# Patient Record
Sex: MALE | Race: WHITE | Employment: OTHER | URBAN - METROPOLITAN AREA
[De-identification: names, ages, dates, MRNs, and addresses within clinical notes are randomized per-mention and may not be internally consistent; named-entity substitution may affect disease eponyms.]

---

## 2017-08-17 DIAGNOSIS — F17.200 NICOTINE USE DISORDER: ICD-10-CM

## 2017-08-17 RX ORDER — BUPROPION HYDROCHLORIDE 150 MG/1
TABLET, EXTENDED RELEASE ORAL
Qty: 60 TABLET | Refills: 0 | Status: SHIPPED | OUTPATIENT
Start: 2017-08-17 | End: 2019-03-27

## 2019-03-27 ENCOUNTER — TELEPHONE (OUTPATIENT)
Dept: FAMILY MEDICINE CLINIC | Age: 59
End: 2019-03-27

## 2019-03-27 ENCOUNTER — OFFICE VISIT (OUTPATIENT)
Dept: FAMILY MEDICINE CLINIC | Age: 59
End: 2019-03-27
Payer: COMMERCIAL

## 2019-03-27 VITALS
WEIGHT: 221 LBS | HEIGHT: 73 IN | OXYGEN SATURATION: 96 % | HEART RATE: 72 BPM | SYSTOLIC BLOOD PRESSURE: 112 MMHG | TEMPERATURE: 98.7 F | RESPIRATION RATE: 15 BRPM | DIASTOLIC BLOOD PRESSURE: 70 MMHG | BODY MASS INDEX: 29.29 KG/M2

## 2019-03-27 DIAGNOSIS — K31.84 GASTROPARESIS: ICD-10-CM

## 2019-03-27 DIAGNOSIS — E03.9 HYPOTHYROIDISM, UNSPECIFIED TYPE: ICD-10-CM

## 2019-03-27 DIAGNOSIS — E66.3 OVERWEIGHT: ICD-10-CM

## 2019-03-27 DIAGNOSIS — Z72.0 TOBACCO USE: Primary | ICD-10-CM

## 2019-03-27 DIAGNOSIS — J40 BRONCHITIS: ICD-10-CM

## 2019-03-27 DIAGNOSIS — R05.9 COUGH: ICD-10-CM

## 2019-03-27 PROCEDURE — 3017F COLORECTAL CA SCREEN DOC REV: CPT | Performed by: INTERNAL MEDICINE

## 2019-03-27 PROCEDURE — G8484 FLU IMMUNIZE NO ADMIN: HCPCS | Performed by: INTERNAL MEDICINE

## 2019-03-27 PROCEDURE — 99204 OFFICE O/P NEW MOD 45 MIN: CPT | Performed by: INTERNAL MEDICINE

## 2019-03-27 PROCEDURE — G8419 CALC BMI OUT NRM PARAM NOF/U: HCPCS | Performed by: INTERNAL MEDICINE

## 2019-03-27 PROCEDURE — G8427 DOCREV CUR MEDS BY ELIG CLIN: HCPCS | Performed by: INTERNAL MEDICINE

## 2019-03-27 PROCEDURE — 4004F PT TOBACCO SCREEN RCVD TLK: CPT | Performed by: INTERNAL MEDICINE

## 2019-03-27 RX ORDER — ONDANSETRON 4 MG/1
4 TABLET, FILM COATED ORAL EVERY 8 HOURS PRN
COMMUNITY

## 2019-03-27 RX ORDER — TRAZODONE HYDROCHLORIDE 50 MG/1
50 TABLET ORAL NIGHTLY
Qty: 10 TABLET | Refills: 0 | Status: SHIPPED | OUTPATIENT
Start: 2019-03-27 | End: 2019-04-10 | Stop reason: SDUPTHER

## 2019-03-27 RX ORDER — DEXTROMETHORPHAN HYDROBROMIDE AND PROMETHAZINE HYDROCHLORIDE 15; 6.25 MG/5ML; MG/5ML
5 SYRUP ORAL 4 TIMES DAILY PRN
Qty: 150 ML | Refills: 0 | Status: SHIPPED | OUTPATIENT
Start: 2019-03-27 | End: 2019-03-27 | Stop reason: DRUGHIGH

## 2019-03-27 RX ORDER — VARENICLINE TARTRATE 0.5 MG/1
0.5 TABLET, FILM COATED ORAL 2 TIMES DAILY
COMMUNITY

## 2019-03-27 ASSESSMENT — PATIENT HEALTH QUESTIONNAIRE - PHQ9
1. LITTLE INTEREST OR PLEASURE IN DOING THINGS: 0
SUM OF ALL RESPONSES TO PHQ QUESTIONS 1-9: 0
2. FEELING DOWN, DEPRESSED OR HOPELESS: 0
SUM OF ALL RESPONSES TO PHQ9 QUESTIONS 1 & 2: 0
SUM OF ALL RESPONSES TO PHQ QUESTIONS 1-9: 0

## 2019-03-27 ASSESSMENT — ENCOUNTER SYMPTOMS
SHORTNESS OF BREATH: 0
EYE PAIN: 0
ABDOMINAL PAIN: 0
BACK PAIN: 0
COUGH: 1

## 2019-04-10 ENCOUNTER — OFFICE VISIT (OUTPATIENT)
Dept: FAMILY MEDICINE CLINIC | Age: 59
End: 2019-04-10
Payer: COMMERCIAL

## 2019-04-10 VITALS
DIASTOLIC BLOOD PRESSURE: 72 MMHG | HEART RATE: 75 BPM | HEIGHT: 72 IN | BODY MASS INDEX: 30.37 KG/M2 | SYSTOLIC BLOOD PRESSURE: 128 MMHG | OXYGEN SATURATION: 99 % | TEMPERATURE: 97.2 F | WEIGHT: 224.2 LBS | RESPIRATION RATE: 15 BRPM

## 2019-04-10 DIAGNOSIS — J40 BRONCHITIS: ICD-10-CM

## 2019-04-10 DIAGNOSIS — F51.01 PRIMARY INSOMNIA: ICD-10-CM

## 2019-04-10 DIAGNOSIS — R05.9 COUGH: Primary | ICD-10-CM

## 2019-04-10 DIAGNOSIS — Z72.0 TOBACCO USE: ICD-10-CM

## 2019-04-10 PROCEDURE — 4004F PT TOBACCO SCREEN RCVD TLK: CPT | Performed by: INTERNAL MEDICINE

## 2019-04-10 PROCEDURE — G8427 DOCREV CUR MEDS BY ELIG CLIN: HCPCS | Performed by: INTERNAL MEDICINE

## 2019-04-10 PROCEDURE — 3017F COLORECTAL CA SCREEN DOC REV: CPT | Performed by: INTERNAL MEDICINE

## 2019-04-10 PROCEDURE — G8417 CALC BMI ABV UP PARAM F/U: HCPCS | Performed by: INTERNAL MEDICINE

## 2019-04-10 PROCEDURE — 99214 OFFICE O/P EST MOD 30 MIN: CPT | Performed by: INTERNAL MEDICINE

## 2019-04-10 RX ORDER — PREDNISONE 10 MG/1
TABLET ORAL
Qty: 28 TABLET | Refills: 0 | Status: SHIPPED | OUTPATIENT
Start: 2019-04-10

## 2019-04-10 RX ORDER — TRAZODONE HYDROCHLORIDE 50 MG/1
50 TABLET ORAL NIGHTLY
Qty: 90 TABLET | Refills: 0 | Status: SHIPPED | OUTPATIENT
Start: 2019-04-10 | End: 2019-08-26 | Stop reason: SDUPTHER

## 2019-04-10 ASSESSMENT — ENCOUNTER SYMPTOMS
BACK PAIN: 0
ABDOMINAL PAIN: 0
COUGH: 1
EYE PAIN: 0
SHORTNESS OF BREATH: 0

## 2019-04-10 NOTE — PROGRESS NOTES
Subjective:      Patient ID: Elizabeth Dalal is a 60 y.o. male who presents today with:  Chief Complaint   Patient presents with    2 Week Follow-Up    Cough     non productive cough, worse at night, will have coughing fits and this is making it difficult to sleep        HPI   Friday cough went back to baseline, but then worsened after Friday. Cough is a total of three weeks. Worse at night. Worse when you lay flat. He mentions he was tested for acid reflux. He has known gastroparesis. His symptoms are instantaneous when he lays flat. Mostly non productive.  He mentions he made a mistake and t    Past Medical History:   Diagnosis Date    Asthma     CAD (coronary artery disease)     CAP (community acquired pneumonia)     Chronic kidney disease     kidney stones     Colonic polyp     colonoscopy 9/4/2014    Heart attack ([address] 04/3/2016    Hyperlipidemia     Hypertension     Hypothyroidism     Kidney stone     Osteoarthritis     PUD (peptic ulcer disease)     ugi 9/4/2014    Type II or unspecified type diabetes mellitus without mention of complication, not stated as uncontrolled      Past Surgical History:   Procedure Laterality Date    CARDIAC CATHETERIZATION  4/6/2016    4 stents     CARDIAC CATHETERIZATION  4/7/2016    3 stents     COLONOSCOPY  9/3/14    polypectomy tessie    KNEE ARTHROSCOPY Bilateral     LITHOTRIPSY  2000s    PTCA  2013    stenting x 3 - dr Richard Puentes Left 2004    UPPER GASTROINTESTINAL ENDOSCOPY  9/3/14    bx tessie     Social History     Socioeconomic History    Marital status:      Spouse name: Not on file    Number of children: Not on file    Years of education: Not on file    Highest education level: Not on file   Occupational History    Not on file   Social Needs    Financial resource strain: Not on file    Food insecurity:     Worry: Not on file     Inability: Not on file    Transportation needs:     Medical: Not on file Respiratory: Positive for cough. Negative for shortness of breath. Cardiovascular: Negative for chest pain. Gastrointestinal: Negative for abdominal pain. Genitourinary: Negative for hematuria. Musculoskeletal: Negative for back pain. Allergic/Immunologic: Negative for immunocompromised state. Neurological: Negative for headaches. Psychiatric/Behavioral: Negative for hallucinations. Objective:   /72 (Site: Left Upper Arm, Position: Sitting, Cuff Size: Large Adult)   Pulse 75   Temp 97.2 °F (36.2 °C) (Tympanic)   Resp 15   Ht 6' (1.829 m)   Wt 224 lb 3.2 oz (101.7 kg)   SpO2 99%   BMI 30.41 kg/m²     Physical Exam   Constitutional: He is oriented to person, place, and time. He appears well-developed and well-nourished. HENT:   Head: Normocephalic. Eyes: Pupils are equal, round, and reactive to light. Neck: No tracheal deviation present. Cardiovascular: Normal rate, regular rhythm and normal heart sounds. Exam reveals no gallop and no friction rub. No murmur heard. Pulmonary/Chest: No stridor. No respiratory distress. He has wheezes (mild scattered exp ). He has no rales. Abdominal: Soft. Bowel sounds are normal. He exhibits no distension. There is no rebound. Musculoskeletal: He exhibits no edema. Neurological: He is oriented to person, place, and time. Skin: Skin is warm and dry. Assessment:       Diagnosis Orders   1. Cough  XR CHEST STANDARD (2 VW)    predniSONE (DELTASONE) 10 MG tablet   2. Tobacco use  XR CHEST STANDARD (2 VW)    predniSONE (DELTASONE) 10 MG tablet   3. Bronchitis  XR CHEST STANDARD (2 VW)    predniSONE (DELTASONE) 10 MG tablet   4. Primary insomnia  traZODone (DESYREL) 50 MG tablet         Plan:   Differential is gerd (which he doesn't agree with) persistent cough post pneumonia vs Allergies vs valvular disease vs pulmonary hypertension vs other. No fever, no purulent sputum, no hypoxia, fevers, or chills, will hold on CXR. Call asap if anything should worsen and will pursue antibiotics    Will start with prednisone  No more codeine given recent use  Make another attempt at records  CXR might still \"be lagging behind\" when he had CAP/HCAP at [facility]   If you dyspnea, chest pain, go to ER. Stay on trazodone  Risk of Priapisms explained. [facility] which [facility] 5 mg bid  He understands it can be fatal.   He doesn't want to stay on eliquis lifelong he understands PE can be fatal.   He understands we think this is an unprovoked PE. Call me if cough is stable and not better then he'll consider ppi. [facility] or [facility]  He understands risk of cancer. Orders Placed This Encounter   Procedures    XR CHEST STANDARD (2 VW)     Standing Status:   Future     Standing Expiration Date:   4/10/2020     Order Specific Question:   Reason for exam:     Answer:   cough     Orders Placed This Encounter   Medications    predniSONE (DELTASONE) 10 MG tablet     Sig: Take 4 tabs po qd x 2 days, then 3 tabs po qd x 3 days, then 2 tabs po qd x 3 days, then 1 tab po qd x 3 days, then 1/2 tab po qd x 4 days. Dispense:  28 tablet     Refill:  0    traZODone (DESYREL) 50 MG tablet     Sig: Take 1 tablet by mouth nightly     Dispense:  90 tablet     Refill:  0       Return for worsening symptoms, call ASAP for appointment, regularly scheduled appointment with PCP. Blanco Spain MD    If anything should change or worsen call ASAP, don't wait for next scheduled appointment.

## 2019-04-17 ENCOUNTER — TELEPHONE (OUTPATIENT)
Dept: FAMILY MEDICINE CLINIC | Age: 59
End: 2019-04-17

## 2019-04-17 DIAGNOSIS — N20.0 KIDNEY STONES: Primary | ICD-10-CM

## 2019-04-17 NOTE — TELEPHONE ENCOUNTER
On review of records patient had a 1.2 cm cluster of kidney stones on right. Has he seen urology for this yet?

## 2019-04-18 NOTE — TELEPHONE ENCOUNTER
Pt established with Dr. Dean Carrasco and states he \"will never see Dr. Dean Carrasco again because he is a quack. \" he states he is not having any issues with his kidney stones and will not be scheduling an appointment.

## 2019-04-29 ENCOUNTER — TELEPHONE (OUTPATIENT)
Dept: FAMILY MEDICINE CLINIC | Age: 59
End: 2019-04-29

## 2019-04-29 NOTE — TELEPHONE ENCOUNTER
Just reminding him, I would like to have a follow up cxr to make sure everything has resolved. Rarely a lung nodule or early lung cancer can cuase these findings, he can have this done at the [facility] if cheaper and have it faxed here.

## 2019-08-26 DIAGNOSIS — F51.01 PRIMARY INSOMNIA: ICD-10-CM

## 2019-08-26 RX ORDER — TRAZODONE HYDROCHLORIDE 50 MG/1
TABLET ORAL
Qty: 90 TABLET | Refills: 0 | Status: SHIPPED | OUTPATIENT
Start: 2019-08-26

## 2020-06-03 ENCOUNTER — TELEPHONE (OUTPATIENT)
Dept: INTERNAL MEDICINE | Age: 60
End: 2020-06-03

## 2020-06-03 NOTE — TELEPHONE ENCOUNTER
Lindsay Hoffman was contacted to set up a video visit with Radha Handley MD.  Patient informed me that his insurance changed and he is now seeing Dr Tacho Mariano at the [facility].     Chelo Frank

## 2024-03-06 ENCOUNTER — HOSPITAL ENCOUNTER (OUTPATIENT)
Dept: RADIOLOGY | Facility: HOSPITAL | Age: 64
Discharge: HOME | End: 2024-03-06
Payer: OTHER MISCELLANEOUS

## 2024-03-06 DIAGNOSIS — I50.9 CHF (CONGESTIVE HEART FAILURE) (MULTI): ICD-10-CM

## 2024-03-06 PROCEDURE — 36573 INSJ PICC RS&I 5 YR+: CPT

## 2024-03-06 PROCEDURE — 2780000003 HC OR 278 NO HCPCS

## 2024-03-06 PROCEDURE — 71045 X-RAY EXAM CHEST 1 VIEW: CPT | Mod: 59

## 2024-03-06 PROCEDURE — C1751 CATH, INF, PER/CENT/MIDLINE: HCPCS

## 2024-03-06 NOTE — CONSULTS
Reason For Consult  PICC placement. Patient is receiving Hospice care and is on Milrinone IV therapy. He accidentally pulled his PICC line out last night and his Hospice Doctor ordered a new one to be placed.    Allergies    Patient has no allergy information on record.    Assessment/Plan     Patient verbalized understanding of risks/benefits of line placement as well as line care and infection prevention. Patient signed consent electronically. PowerPICC placed to FRANKLIN brachial vein using Modified Seldinger technique and ultrasound guidance. Brisk blood return noted, line flushes easily. Unable to determine tip placement because patient in Afib, CXR completed, awaiting confirmation. Biopatch placed, line secured with statlock and tegaderm cover placed.     Lorena Christian RN      Addendum: Radiologist report did not refer to tip placement. Attempts to reach radiologist unsuccessful, [facility] notified x2. Patient instructed not to use PICC line until tip placement is confirmed. Patient verbalized understanding.     Addendum: Radiologist confirmed 3/7/2024 0800 that PICC line tip is correctly placed; line is ready for immediate use. [facility] notified.

## 2024-04-23 ENCOUNTER — LAB REQUISITION (OUTPATIENT)
Dept: LAB | Facility: HOSPITAL | Age: 64
End: 2024-04-23
Payer: OTHER MISCELLANEOUS

## 2024-04-23 DIAGNOSIS — I48.91 UNSPECIFIED ATRIAL FIBRILLATION (MULTI): ICD-10-CM

## 2024-04-23 DIAGNOSIS — I50.20 UNSPECIFIED SYSTOLIC (CONGESTIVE) HEART FAILURE (MULTI): ICD-10-CM

## 2024-04-23 DIAGNOSIS — I50.84 END STAGE HEART FAILURE (MULTI): ICD-10-CM

## 2024-04-23 LAB
ALBUMIN SERPL BCP-MCNC: 3.8 G/DL (ref 3.4–5)
ALP SERPL-CCNC: 64 U/L (ref 33–136)
ALT SERPL W P-5'-P-CCNC: 8 U/L (ref 10–52)
ANION GAP SERPL CALC-SCNC: 11 MMOL/L (ref 10–20)
AST SERPL W P-5'-P-CCNC: 13 U/L (ref 9–39)
BILIRUB SERPL-MCNC: 0.9 MG/DL (ref 0–1.2)
BNP SERPL-MCNC: 1505 PG/ML (ref 0–99)
BUN SERPL-MCNC: 17 MG/DL (ref 6–23)
CALCIUM SERPL-MCNC: 9.4 MG/DL (ref 8.6–10.3)
CHLORIDE SERPL-SCNC: 100 MMOL/L (ref 98–107)
CO2 SERPL-SCNC: 30 MMOL/L (ref 21–32)
CREAT SERPL-MCNC: 1.01 MG/DL (ref 0.5–1.3)
EGFRCR SERPLBLD CKD-EPI 2021: 83 ML/MIN/1.73M*2
ERYTHROCYTE [DISTWIDTH] IN BLOOD BY AUTOMATED COUNT: 14.1 % (ref 11.5–14.5)
GLUCOSE SERPL-MCNC: 145 MG/DL (ref 74–99)
HCT VFR BLD AUTO: 35.7 % (ref 41–52)
HGB BLD-MCNC: 12 G/DL (ref 13.5–17.5)
MCH RBC QN AUTO: 31.4 PG (ref 26–34)
MCHC RBC AUTO-ENTMCNC: 33.6 G/DL (ref 32–36)
MCV RBC AUTO: 94 FL (ref 80–100)
NRBC BLD-RTO: 0 /100 WBCS (ref 0–0)
PLATELET # BLD AUTO: 246 X10*3/UL (ref 150–450)
POTASSIUM SERPL-SCNC: 3.1 MMOL/L (ref 3.5–5.3)
PROT SERPL-MCNC: 6.1 G/DL (ref 6.4–8.2)
RBC # BLD AUTO: 3.82 X10*6/UL (ref 4.5–5.9)
SODIUM SERPL-SCNC: 138 MMOL/L (ref 136–145)
WBC # BLD AUTO: 9.5 X10*3/UL (ref 4.4–11.3)

## 2024-04-23 PROCEDURE — 80053 COMPREHEN METABOLIC PANEL: CPT

## 2024-04-23 PROCEDURE — 85027 COMPLETE CBC AUTOMATED: CPT

## 2024-04-23 PROCEDURE — 83880 ASSAY OF NATRIURETIC PEPTIDE: CPT

## 2024-05-03 ENCOUNTER — LAB REQUISITION (OUTPATIENT)
Dept: LAB | Facility: HOSPITAL | Age: 64
End: 2024-05-03
Payer: OTHER MISCELLANEOUS

## 2024-05-03 DIAGNOSIS — N39.0 URINARY TRACT INFECTION, SITE NOT SPECIFIED: ICD-10-CM

## 2024-05-03 LAB
APPEARANCE UR: ABNORMAL
COLOR UR: ABNORMAL
MUCOUS THREADS #/AREA URNS AUTO: ABNORMAL /LPF
RBC #/AREA URNS AUTO: >20 /HPF
WBC #/AREA URNS AUTO: >50 /HPF

## 2024-05-03 PROCEDURE — 81001 URINALYSIS AUTO W/SCOPE: CPT

## 2024-05-04 LAB — HOLD SPECIMEN: NORMAL

## 2024-05-06 ENCOUNTER — HOSPITAL ENCOUNTER (EMERGENCY)
Age: 64
Discharge: HOME | End: 2024-05-06

## 2024-05-06 VITALS
RESPIRATION RATE: 16 BRPM | OXYGEN SATURATION: 98 % | BODY MASS INDEX: 19.77 KG/M2 | TEMPERATURE: 98.2 F | HEIGHT: 72 IN | SYSTOLIC BLOOD PRESSURE: 113 MMHG | HEART RATE: 86 BPM | DIASTOLIC BLOOD PRESSURE: 73 MMHG | WEIGHT: 146 LBS

## 2024-05-06 DIAGNOSIS — S09.90XA INJURY OF HEAD, INITIAL ENCOUNTER: ICD-10-CM

## 2024-05-06 DIAGNOSIS — S01.01XA LACERATION OF SCALP, INITIAL ENCOUNTER: ICD-10-CM

## 2024-05-06 PROCEDURE — 12001 RPR S/N/AX/GEN/TRNK 2.5CM/<: CPT

## 2024-05-06 PROCEDURE — 99283 EMERGENCY DEPT VISIT LOW MDM: CPT

## 2024-05-06 RX ORDER — GINSENG 100 MG
CAPSULE ORAL
Qty: 15 G | Refills: 0 | Status: SHIPPED | OUTPATIENT
Start: 2024-05-06

## 2024-05-06 RX ORDER — HYDROCODONE BITARTRATE AND ACETAMINOPHEN 5; 325 MG/1; MG/1
1 TABLET ORAL EVERY 6 HOURS PRN
Qty: 10 TABLET | Refills: 0 | Status: SHIPPED | OUTPATIENT
Start: 2024-05-06 | End: 2024-05-09

## 2024-05-06 ASSESSMENT — ENCOUNTER SYMPTOMS
BACK PAIN: 0
ABDOMINAL PAIN: 0
COUGH: 0
SHORTNESS OF BREATH: 0

## 2024-05-06 ASSESSMENT — PAIN DESCRIPTION - LOCATION: LOCATION: ABDOMEN

## 2024-05-06 ASSESSMENT — PAIN SCALES - GENERAL: PAINLEVEL_OUTOF10: 9

## 2024-05-06 ASSESSMENT — PAIN DESCRIPTION - PAIN TYPE: TYPE: ACUTE PAIN

## 2024-05-06 ASSESSMENT — PAIN DESCRIPTION - ONSET: ONSET: ON-GOING

## 2024-05-06 ASSESSMENT — PAIN - FUNCTIONAL ASSESSMENT: PAIN_FUNCTIONAL_ASSESSMENT: 0-10

## 2024-05-06 NOTE — DISCHARGE INSTR - COC
Continuity of Care Form    Patient Name: Scott Rivers   :  1960  MRN:  [id number]    Admit date:  2024  Discharge date:  ***    Code Status Order: No Order   Advance Directives:     Admitting Physician:  No admitting provider for patient encounter.  PCP: Katt Modi MD    Discharging Nurse: ***  Discharging Hospital Unit/Room#:   Discharging Unit Phone Number: ***    Emergency Contact:   Extended Emergency Contact Information  Primary Emergency Contact: Krysten Rivers   [facility]  Home Phone: [phone]  Relation: Spouse    Past Surgical History:  Past Surgical History:   Procedure Laterality Date    CARDIAC CATHETERIZATION  2016    4 stents     CARDIAC CATHETERIZATION  2016    3 stents     COLONOSCOPY  9/3/14    polypectomy jarmoszuk    KNEE ARTHROSCOPY Bilateral     LITHOTRIPSY  2000s    PTCA  2013    stenting x 3 - dr awad    ROTATOR CUFF REPAIR Left     UPPER GASTROINTESTINAL ENDOSCOPY  9/3/14    bx tessie       Immunization History:     There is no immunization history on file for this patient.    Active Problems:  Patient Active Problem List   Diagnosis Code    Type II or unspecified type diabetes mellitus without mention of complication, not stated as uncontrolled E11.9    Chest pain R07.9    Essential hypertension I10    Pure hypercholesterolemia E78.00    Obstructive sleep apnea syndrome G47.33    Acquired hypothyroidism E03.9    Smoker F17.200       Isolation/Infection:   Isolation            No Isolation          Patient Infection Status       None to display            Nurse Assessment:  Last Vital Signs: /73   Pulse 86   Temp 98.2 °F (36.8 °C)   Resp 16   Ht 1.829 m (6')   Wt 66.2 kg (146 lb)   SpO2 98%   BMI 19.80 kg/m²     Last documented pain score (0-10 scale): Pain Level: 9  Last Weight:   Wt Readings from Last 1 Encounters:   24 66.2 kg (146 lb)     Mental Status:  {IP PT MENTAL STATUS:}    IV Access:  {[facility] IV

## 2024-05-06 NOTE — ED PROVIDER NOTES
05/06/24 1110 05/06/24 1110   112/66 98.2 °F (36.8 °C)  90 18 98 % 1.829 m (6') 66.2 kg (146 lb)       Physical Exam  Vitals and nursing note reviewed.   Constitutional:       Appearance: He is well-developed.   HENT:      Head: Normocephalic. Laceration present.        Right Ear: Hearing, tympanic membrane, ear canal and external ear normal.      Left Ear: Hearing, tympanic membrane, ear canal and external ear normal.      Nose: Nose normal.      Mouth/Throat:      Lips: Pink.      Mouth: Mucous membranes are moist.   Eyes:      Conjunctiva/sclera: Conjunctivae normal.      Pupils: Pupils are equal, round, and reactive to light.   Cardiovascular:      Rate and Rhythm: Normal rate and regular rhythm.      Heart sounds: Normal heart sounds.   Pulmonary:      Effort: Pulmonary effort is normal. No accessory muscle usage or respiratory distress.      Breath sounds: Normal breath sounds. No decreased air movement. No decreased breath sounds, wheezing or rhonchi.   Abdominal:      General: Bowel sounds are normal. There is no distension.      Palpations: Abdomen is soft.      Tenderness: There is no abdominal tenderness.   Musculoskeletal:         General: Normal range of motion.      Cervical back: Normal range of motion and neck supple.   Skin:     General: Skin is warm and dry.   Neurological:      General: No focal deficit present.      Mental Status: He is alert and oriented to person, place, and time.      GCS: GCS eye subscore is 4. GCS verbal subscore is 5. GCS motor subscore is 6.      Cranial Nerves: Cranial nerves 2-12 are intact.      Sensory: Sensation is intact.      Deep Tendon Reflexes: Reflexes are normal and symmetric.   Psychiatric:         Judgment: Judgment normal.           All other labs were within normal range or not returned as of this dictation.    EMERGENCY DEPARTMENT COURSE and DIFFERENTIALDIAGNOSIS/MDM:   Vitals:    Vitals:    05/06/24 1058 05/06/24 1110   BP: 112/66 113/73   Pulse: 90 86

## 2024-05-06 NOTE — ED TRIAGE NOTES
Pt presents to Er from home with a fall from stumbling and hit the frontal area of his head, bleeding is controlled and did not have LOC. Pt is a hospice patient and has a central line that he has Milrinone/D5W that infuses. Pt did take dilaudid at home this morning around 0800. Pt is A&Ox4, warm and dry with vitals stable at this time. History of a-fib and is paced.

## 2024-05-17 ENCOUNTER — HOSPITAL ENCOUNTER (OUTPATIENT)
Dept: RADIOLOGY | Facility: HOSPITAL | Age: 64
Discharge: HOME | End: 2024-05-17
Payer: OTHER MISCELLANEOUS

## 2024-05-17 DIAGNOSIS — I50.84 END STAGE HEART FAILURE (MULTI): ICD-10-CM

## 2024-05-17 PROCEDURE — 71045 X-RAY EXAM CHEST 1 VIEW: CPT | Performed by: RADIOLOGY

## 2024-05-17 PROCEDURE — 2780000003 HC OR 278 NO HCPCS

## 2024-05-17 PROCEDURE — 71045 X-RAY EXAM CHEST 1 VIEW: CPT | Mod: 59

## 2024-05-17 PROCEDURE — C1751 CATH, INF, PER/CENT/MIDLINE: HCPCS

## 2024-05-17 PROCEDURE — 36573 INSJ PICC RS&I 5 YR+: CPT

## 2024-05-17 PROCEDURE — 71045 X-RAY EXAM CHEST 1 VIEW: CPT

## 2024-05-17 NOTE — Clinical Note
Dr. Medeiros read CXR, states tip placement is good; will enter official read once CXR loaded in PACS.

## 2024-05-17 NOTE — Clinical Note
Patient reporting numbness to arm after PICC insertion that is not alleviated with movement. PICC removed intact and new site being scouted for access point.

## 2024-05-17 NOTE — Clinical Note
Patient verbalized understanding of risks/benefits/indications for/infection prevention with PICC insertion.

## 2024-05-17 NOTE — NURSING NOTE
Patient verbalized understanding of PICC insertion indications/risks/benefits/infection prevention/ and signed consent electronically. PICC inserted to FRANKLIN brachial vein (see removed line in LDA for details) with no apparent issues. CXR ordered for tip confirmation because patient in atrial fibrillation. After CXR completed patient stated arm was feeling numb/tingling with no alleviation after moving arm/hand and waiting to see if lidocaine was the root cause. The line was removed intact. Numbness/tingling subsided then stopped entirely prior to patient leaving. PICC line placed to GALLO brachial vessel using Modified Seldinger technique. Line noted to have dill blood return  and  flushed easily.  Line secured w/Stat-lock, Biopatch  placed, and central line Tegaderm dressing placed. Dressing labeled per  protocol. Line 47 cm length, arm circumference is 28 cm at insertion site. Line inserted to 0 crescencio. CXR completed and was read immediately by Dr. Fernando Medeiros, who stated catheter tip was in cavo-atrial junction. Patient denied numbness/tingling/pain to bilateral arm. Patient walked to waiting room independently w/this RN. PICC ready for immediate use.

## 2024-06-07 ENCOUNTER — HOSPITAL ENCOUNTER (OUTPATIENT)
Dept: RADIOLOGY | Facility: HOSPITAL | Age: 64
Discharge: HOME | End: 2024-06-07
Payer: OTHER MISCELLANEOUS

## 2024-06-07 DIAGNOSIS — I50.20 UNSPECIFIED SYSTOLIC (CONGESTIVE) HEART FAILURE (MULTI): ICD-10-CM

## 2024-06-07 PROCEDURE — 36569 INSJ PICC 5 YR+ W/O IMAGING: CPT

## 2024-06-07 PROCEDURE — 2780000003 HC OR 278 NO HCPCS

## 2024-06-07 PROCEDURE — C1751 CATH, INF, PER/CENT/MIDLINE: HCPCS

## 2024-06-07 PROCEDURE — 71045 X-RAY EXAM CHEST 1 VIEW: CPT | Performed by: RADIOLOGY

## 2024-06-07 PROCEDURE — 71045 X-RAY EXAM CHEST 1 VIEW: CPT

## 2024-06-07 NOTE — PROCEDURES
Existing 4 Hungarian 47 cm left brachial PICC pulled back to approximately 38 cm crescencio. No s/s of redness drainage or edema noted at existing PICC site. Dressing removed and existing PICC and site thoroughly cleansed with choraprep x2. Site draped in normal sterile fashion and normal sterile precautions observed. Existing PICC cut and Flexura guidewire advanced through existing PICC remnant without resistance. Remainder of PICC then removed. Tip found to be in tact at original 47 cm cut crescencio. Peel away sheath then placed over the guide wire without incident. New Bard single lumen PICC cut to 47 cm crescencio and easily advanced though peel away sheath. Resistance was repeatedly met at subclavian area. Exchange aborted and decision was made for new placement on right side.     4 Hungarian 45 cm length with 1 cm external Bard single lumen power PICC inserted to right basilic vein using modified seldinger technique and ultrasound guidance with single attempt. Insertion site was cleansed with chloraprep x2, pt draped in normal sterile fashion and usual sterile precautions observed.  Catheter tip confirmed at distal SVC by chest x ray as pt in A fib and Intravascular ECG unable to be used. PICC flushes easily and has brisk blood return. PICC secured with stat lock and dressed with biopatch and tegaderm. PICC is ready for immediate use. Pt tolerated procedure well with no apparent complications.

## 2024-07-05 ENCOUNTER — HOSPITAL ENCOUNTER (OUTPATIENT)
Dept: RADIOLOGY | Facility: HOSPITAL | Age: 64
Discharge: HOME | End: 2024-07-05
Payer: OTHER MISCELLANEOUS

## 2024-07-05 DIAGNOSIS — I50.84 END STAGE HEART FAILURE (MULTI): ICD-10-CM

## 2024-07-05 PROCEDURE — 71045 X-RAY EXAM CHEST 1 VIEW: CPT

## 2024-07-05 PROCEDURE — C1751 CATH, INF, PER/CENT/MIDLINE: HCPCS

## 2024-07-05 PROCEDURE — 71045 X-RAY EXAM CHEST 1 VIEW: CPT | Performed by: RADIOLOGY

## 2024-07-05 PROCEDURE — 36569 INSJ PICC 5 YR+ W/O IMAGING: CPT

## 2024-07-05 PROCEDURE — 2780000003 HC OR 278 NO HCPCS

## 2024-07-05 NOTE — PROCEDURES
4 Danish 44 cm length Bard single lumen power PICC inserted to right basilic vein using modified seldinger technique and ultrasound guidance with single attempt. Insertion site was cleansed with chloraprep x2, pt draped in normal sterile fashion and usual sterile precautions observed.  Catheter tip confirmed at cavoatrial junction by chest x ray. PICC flushes easily and has brisk blood return. PICC secured with stat lock and dressed with biopatch and tegaderm. PICC is ready for immediate use. Pt tolerated procedure well with no apparent complications.

## 2024-08-09 ENCOUNTER — HOSPITAL ENCOUNTER (OUTPATIENT)
Dept: RADIOLOGY | Facility: HOSPITAL | Age: 64
Discharge: HOME | End: 2024-08-09
Payer: OTHER MISCELLANEOUS

## 2024-08-09 ENCOUNTER — HOSPITAL ENCOUNTER (EMERGENCY)
Facility: HOSPITAL | Age: 64
Discharge: STILL ADMITTED OR ADMINISTRATIVE | End: 2024-08-09
Payer: MEDICARE

## 2024-08-09 DIAGNOSIS — E11.9 TYPE 2 DIABETES MELLITUS WITHOUT COMPLICATIONS (MULTI): ICD-10-CM

## 2024-08-09 DIAGNOSIS — I48.91 UNSPECIFIED ATRIAL FIBRILLATION (MULTI): ICD-10-CM

## 2024-08-09 DIAGNOSIS — I50.84 END STAGE HEART FAILURE (MULTI): ICD-10-CM

## 2024-08-09 DIAGNOSIS — I25.10 ATHEROSCLEROTIC HEART DISEASE OF NATIVE CORONARY ARTERY WITHOUT ANGINA PECTORIS: ICD-10-CM

## 2024-08-09 DIAGNOSIS — I50.20 UNSPECIFIED SYSTOLIC (CONGESTIVE) HEART FAILURE (MULTI): ICD-10-CM

## 2024-08-09 PROCEDURE — 2780000003 HC OR 278 NO HCPCS

## 2024-08-09 PROCEDURE — 71045 X-RAY EXAM CHEST 1 VIEW: CPT | Performed by: STUDENT IN AN ORGANIZED HEALTH CARE EDUCATION/TRAINING PROGRAM

## 2024-08-09 PROCEDURE — 71045 X-RAY EXAM CHEST 1 VIEW: CPT

## 2024-08-09 PROCEDURE — 36573 INSJ PICC RS&I 5 YR+: CPT

## 2024-08-09 PROCEDURE — 4500999001 HC ED NO CHARGE

## 2024-08-09 PROCEDURE — C1751 CATH, INF, PER/CENT/MIDLINE: HCPCS

## 2024-08-29 ENCOUNTER — HOSPITAL ENCOUNTER (OUTPATIENT)
Dept: RADIOLOGY | Facility: HOSPITAL | Age: 64
Discharge: HOME | End: 2024-08-29
Payer: OTHER MISCELLANEOUS

## 2024-08-29 DIAGNOSIS — I50.20 UNSPECIFIED SYSTOLIC (CONGESTIVE) HEART FAILURE (MULTI): ICD-10-CM

## 2024-08-29 DIAGNOSIS — I25.10 ATHEROSCLEROTIC HEART DISEASE OF NATIVE CORONARY ARTERY WITHOUT ANGINA PECTORIS: ICD-10-CM

## 2024-08-29 DIAGNOSIS — I50.84 END STAGE HEART FAILURE (MULTI): ICD-10-CM

## 2024-08-29 DIAGNOSIS — I48.91 UNSPECIFIED ATRIAL FIBRILLATION (MULTI): ICD-10-CM

## 2024-08-29 DIAGNOSIS — E11.9 TYPE 2 DIABETES MELLITUS WITHOUT COMPLICATIONS (MULTI): ICD-10-CM

## 2024-08-29 PROCEDURE — 71045 X-RAY EXAM CHEST 1 VIEW: CPT

## 2024-08-29 PROCEDURE — 36573 INSJ PICC RS&I 5 YR+: CPT

## 2024-08-29 PROCEDURE — C1751 CATH, INF, PER/CENT/MIDLINE: HCPCS

## 2024-08-29 PROCEDURE — 2780000003 HC OR 278 NO HCPCS

## 2024-08-29 PROCEDURE — 71045 X-RAY EXAM CHEST 1 VIEW: CPT | Performed by: RADIOLOGY
